# Patient Record
Sex: MALE | Race: WHITE | NOT HISPANIC OR LATINO | Employment: PART TIME | URBAN - METROPOLITAN AREA
[De-identification: names, ages, dates, MRNs, and addresses within clinical notes are randomized per-mention and may not be internally consistent; named-entity substitution may affect disease eponyms.]

---

## 2024-11-25 ENCOUNTER — OFFICE VISIT (OUTPATIENT)
Dept: URGENT CARE | Facility: PHYSICIAN GROUP | Age: 49
End: 2024-11-25
Payer: COMMERCIAL

## 2024-11-25 VITALS
RESPIRATION RATE: 16 BRPM | HEIGHT: 72 IN | WEIGHT: 221 LBS | SYSTOLIC BLOOD PRESSURE: 132 MMHG | DIASTOLIC BLOOD PRESSURE: 88 MMHG | OXYGEN SATURATION: 96 % | TEMPERATURE: 98.2 F | HEART RATE: 96 BPM | BODY MASS INDEX: 29.93 KG/M2

## 2024-11-25 DIAGNOSIS — S39.012A STRAIN OF LUMBAR PARASPINOUS MUSCLE, INITIAL ENCOUNTER: ICD-10-CM

## 2024-11-25 RX ORDER — KETOROLAC TROMETHAMINE 15 MG/ML
15 INJECTION, SOLUTION INTRAMUSCULAR; INTRAVENOUS ONCE
Status: COMPLETED | OUTPATIENT
Start: 2024-11-25 | End: 2024-11-25

## 2024-11-25 RX ORDER — AMLODIPINE AND BENAZEPRIL HYDROCHLORIDE 5; 40 MG/1; MG/1
CAPSULE ORAL
COMMUNITY
Start: 2024-09-16

## 2024-11-25 RX ORDER — CYCLOBENZAPRINE HCL 5 MG
5-10 TABLET ORAL 3 TIMES DAILY PRN
Qty: 15 TABLET | Refills: 0 | Status: SHIPPED | OUTPATIENT
Start: 2024-11-25

## 2024-11-25 RX ORDER — ALLOPURINOL 300 MG/1
TABLET ORAL
COMMUNITY
Start: 2024-09-16

## 2024-11-25 RX ORDER — BUPROPION HYDROCHLORIDE 150 MG/1
TABLET ORAL
COMMUNITY
Start: 2024-09-13

## 2024-11-25 RX ORDER — LORATADINE 10 MG/1
1 TABLET ORAL
COMMUNITY

## 2024-11-25 RX ORDER — PANTOPRAZOLE SODIUM 40 MG/1
TABLET, DELAYED RELEASE ORAL
COMMUNITY
Start: 2024-09-16

## 2024-11-25 RX ADMIN — KETOROLAC TROMETHAMINE 15 MG: 15 INJECTION, SOLUTION INTRAMUSCULAR; INTRAVENOUS at 16:39

## 2024-11-25 NOTE — PROGRESS NOTES
Subjective:   Jere Uriostegui is a 49 y.o. male who presents for Low Back Pain (Radiating to R side x 3 days while lifting a heavy hitch.  He is having pain and spasms. )      HPI:  49-year-old male presents to urgent care for 3 days of right sided lumbar back pain after lifting heavy trailer hitch.  Start experiencing pain at the time of lifting this item.  Denies any fall.  No saddle esthesia, loss of bladder control, loss of bowel control.  Has been trying heat without much improvement.    Medications:    allopurinol Tabs  amlodipine-benazepril  buPROPion  cyclobenzaprine  ketorolac  loratadine Tabs  pantoprazole Tbec    Allergies: Patient has no known allergies.    Problem List: Jere Uriostegui does not have a problem list on file.    Surgical History:  No past surgical history on file.    Past Social Hx: Jere Uriostegui  reports that he has quit smoking. His smoking use included cigarettes. He has never used smokeless tobacco. He reports that he does not currently use alcohol. He reports that he does not currently use drugs.     Past Family Hx:  Jere Uriostegui family history is not on file.     Problem list, medications, and allergies reviewed by myself today in Epic.     Objective:     /88   Pulse 96   Temp 36.8 °C (98.2 °F) (Temporal)   Resp 16   Ht 1.829 m (6')   Wt 100 kg (221 lb)   SpO2 96%   BMI 29.97 kg/m²     Physical Exam  Musculoskeletal:      Comments: Lumbar back: No midline spinal tenderness, crepitus, or step-offs.  Does have some tenderness to the right paraspinal musculature.  Able to bear weight.  Pain with forward flexion, ambulation, going from sitting to standing.         Assessment/Plan:     Diagnosis and associated orders:     1. Strain of lumbar paraspinous muscle, initial encounter  ketorolac (Toradol) 15 MG/ML injection 15 mg    cyclobenzaprine (FLEXERIL) 5 mg tablet    Referral to Physical Therapy         Comments/MDM:     Given mechanism of  injury, patient's presentation physical exam findings are consistent with lumbar muscle strain.  No midline spinal tenderness.  No traumatic fall or concern for fracture.  Discussed home supportive care.  Toradol injection given in clinic.  Flexeril as needed.  Referral to physical therapy.  Continue heat.  No red flag signs.         Differential diagnosis, natural history, supportive care, and indications for immediate follow-up discussed.    Advised the patient to follow-up with the primary care physician for recheck, reevaluation, and consideration of further management.    Please note that this dictation was created using voice recognition software. I have made a reasonable attempt to correct obvious errors, but I expect that there are errors of grammar and possibly content that I did not discover before finalizing the note.    Electronically signed by Dino Booker PA-C.

## 2024-11-25 NOTE — LETTER
November 25, 2024    To Whom It May Concern:         This is confirmation that Jere Uriostegui attended his scheduled appointment with Dino Booker P.A.-C. on 11/25/24.  Excuse from work on 11/25/2024 through 11/27/2024.         If you have any questions please do not hesitate to call me at the phone number listed below.    Sincerely,          Dino Booker P.A.-C.  [phone]

## 2024-12-26 ENCOUNTER — OFFICE VISIT (OUTPATIENT)
Dept: URGENT CARE | Facility: PHYSICIAN GROUP | Age: 49
End: 2024-12-26
Payer: COMMERCIAL

## 2024-12-26 VITALS
RESPIRATION RATE: 18 BRPM | HEIGHT: 72 IN | WEIGHT: 229.94 LBS | DIASTOLIC BLOOD PRESSURE: 88 MMHG | SYSTOLIC BLOOD PRESSURE: 132 MMHG | OXYGEN SATURATION: 97 % | BODY MASS INDEX: 31.14 KG/M2 | HEART RATE: 86 BPM | TEMPERATURE: 97.7 F

## 2024-12-26 DIAGNOSIS — R22.0 SWELLING OF UPPER LIP: ICD-10-CM

## 2024-12-26 PROCEDURE — 99214 OFFICE O/P EST MOD 30 MIN: CPT | Performed by: PHYSICIAN ASSISTANT

## 2024-12-26 PROCEDURE — 3075F SYST BP GE 130 - 139MM HG: CPT | Performed by: PHYSICIAN ASSISTANT

## 2024-12-26 PROCEDURE — 3079F DIAST BP 80-89 MM HG: CPT | Performed by: PHYSICIAN ASSISTANT

## 2024-12-26 RX ORDER — PREDNISONE 20 MG/1
40 TABLET ORAL DAILY
Qty: 10 TABLET | Refills: 0 | Status: SHIPPED | OUTPATIENT
Start: 2024-12-26 | End: 2024-12-31

## 2024-12-26 RX ORDER — ACETAMINOPHEN 325 MG/1
650 TABLET ORAL EVERY 4 HOURS PRN
COMMUNITY

## 2024-12-26 RX ORDER — DEXAMETHASONE SODIUM PHOSPHATE 10 MG/ML
10 INJECTION INTRAMUSCULAR; INTRAVENOUS ONCE
Status: COMPLETED | OUTPATIENT
Start: 2024-12-26 | End: 2024-12-26

## 2024-12-26 RX ADMIN — DEXAMETHASONE SODIUM PHOSPHATE 10 MG: 10 INJECTION INTRAMUSCULAR; INTRAVENOUS at 18:23

## 2024-12-26 ASSESSMENT — ENCOUNTER SYMPTOMS
STRIDOR: 0
DIZZINESS: 0
SHORTNESS OF BREATH: 0
PALPITATIONS: 0
HEADACHES: 0
FEVER: 0
VOMITING: 0
SORE THROAT: 0
COUGH: 0
CHILLS: 0
WHEEZING: 0

## 2024-12-27 NOTE — PROGRESS NOTES
Subjective:     CHIEF COMPLAINT  Chief Complaint   Patient presents with    Allergic Reaction     allergic reaction; lip swelling no breathing issues; facial swelling started on left side         HPI  Jere Uriostegui is a very pleasant 49 y.o. male who presents to the clinic with swelling of his upper lip that started approximately 5 hours ago.  Patient cannot think of any new potential contacts or irritants.  Denies any new medications.  No new foods, soaps or detergents.  He is not experiencing any difficulty breathing or swallowing.  No nausea or vomiting.  Has tried Benadryl and Claritin without significant improvement.  States he believes the swelling is gradually going down over the last hour.    REVIEW OF SYSTEMS  Review of Systems   Constitutional:  Negative for chills and fever.   HENT:  Negative for sore throat.         Swelling of the upper lip   Respiratory:  Negative for cough, shortness of breath, wheezing and stridor.    Cardiovascular:  Negative for chest pain and palpitations.   Gastrointestinal:  Negative for vomiting.   Skin:  Negative for rash.   Neurological:  Negative for dizziness and headaches.       PAST MEDICAL HISTORY  There are no active problems to display for this patient.      SURGICAL HISTORY  patient denies any surgical history    ALLERGIES  No Known Allergies    CURRENT MEDICATIONS  Home Medications       Reviewed by Alex Rivas P.A.-C. (Physician Assistant) on 12/26/24 at 1809  Med List Status: <None>     Medication Last Dose Status   acetaminophen (TYLENOL) 325 MG Tab Taking Active   allopurinol (ZYLOPRIM) 300 MG Tab Taking Active   amlodipine-benazepril (LOTREL) 5-40 MG per capsule Taking Active   buPROPion (WELLBUTRIN XL) 150 MG XL tablet Taking Active   cyclobenzaprine (FLEXERIL) 5 mg tablet PRN Active   loratadine (CLARITIN) 10 MG Tab Taking Active   pantoprazole (PROTONIX) 40 MG Tablet Delayed Response Taking Active                    SOCIAL HISTORY  Social History      Tobacco Use    Smoking status: Former     Types: Cigarettes    Smokeless tobacco: Never   Vaping Use    Vaping status: Former   Substance and Sexual Activity    Alcohol use: Not Currently     Comment: Occasionally    Drug use: Not Currently    Sexual activity: Not on file       FAMILY HISTORY  History reviewed. No pertinent family history.       Objective:     VITAL SIGNS: /88   Pulse 86   Temp 36.5 °C (97.7 °F) (Temporal)   Resp 18   Ht 1.829 m (6')   Wt 104 kg (229 lb 15 oz)   SpO2 97%   BMI 31.19 kg/m²     PHYSICAL EXAM  Physical Exam  Constitutional:       General: He is not in acute distress.     Appearance: Normal appearance. He is not ill-appearing, toxic-appearing or diaphoretic.   HENT:      Head: Normocephalic and atraumatic.      Mouth/Throat:      Mouth: Mucous membranes are moist.      Pharynx: No oropharyngeal exudate or posterior oropharyngeal erythema.      Comments: Diffuse swelling of the upper lip.  No lesions present.  No oral mucosal edema.  Posterior oropharynx patent.  No gingival lesions.  Eyes:      Conjunctiva/sclera: Conjunctivae normal.   Cardiovascular:      Rate and Rhythm: Normal rate and regular rhythm.      Pulses: Normal pulses.      Heart sounds: Normal heart sounds.   Pulmonary:      Effort: Pulmonary effort is normal.      Breath sounds: Normal breath sounds. No stridor. No wheezing, rhonchi or rales.   Musculoskeletal:      Cervical back: Normal range of motion.   Neurological:      General: No focal deficit present.      Mental Status: He is alert and oriented to person, place, and time. Mental status is at baseline.         Assessment/Plan:     1. Swelling of upper lip  dexamethasone (Decadron) injection (check route below) 10 mg    predniSONE (DELTASONE) 20 MG Tab          MDM/Comments:    Patient experiencing swelling to the upper lip starting 5 hours ago.  Patient cannot think of any potential irritants that cause this.  States he otherwise is feeling well.   He is breathing comfortably on room air.  Posterior oropharynx patent.  Lung sounds clear.  No nausea or vomiting.  We will treat with 10 mg oral Decadron in clinic.  Course of prednisone sent to the pharmacy.  Continue with antihistamine use as discussed.  Strict ED precautions for any red flag symptoms.  Discharged in stable condition.    Differential diagnosis, natural history, supportive care, and indications for immediate follow-up discussed. All questions answered. Patient agrees with the plan of care.    Follow-up as needed if symptoms worsen or fail to improve to PCP, Urgent care or Emergency Room.    I have personally reviewed prior external notes and test results pertinent to today's visit.  I have independently reviewed and interpreted all diagnostics ordered during this urgent care acute visit.   Discussed management options (risks,benefits, and alternatives to treatment). Pt expresses understanding and the treatment plan was agreed upon. Questions were encouraged and answered to pt's satisfaction.    Please note that this dictation was created using voice recognition software. I have made a reasonable attempt to correct obvious errors, but I expect that there are errors of grammar and possibly content that I did not discover before finalizing the note.